# Patient Record
Sex: FEMALE | Race: WHITE
[De-identification: names, ages, dates, MRNs, and addresses within clinical notes are randomized per-mention and may not be internally consistent; named-entity substitution may affect disease eponyms.]

---

## 2021-12-08 ENCOUNTER — HOSPITAL ENCOUNTER (INPATIENT)
Dept: HOSPITAL 19 - COL.ER | Age: 66
LOS: 5 days | Discharge: HOME | DRG: 871 | End: 2021-12-13
Attending: INTERNAL MEDICINE | Admitting: INTERNAL MEDICINE
Payer: MEDICARE

## 2021-12-08 VITALS — HEIGHT: 65 IN | BODY MASS INDEX: 38.31 KG/M2 | WEIGHT: 229.94 LBS

## 2021-12-08 VITALS — DIASTOLIC BLOOD PRESSURE: 76 MMHG | SYSTOLIC BLOOD PRESSURE: 142 MMHG

## 2021-12-08 VITALS — DIASTOLIC BLOOD PRESSURE: 55 MMHG | SYSTOLIC BLOOD PRESSURE: 147 MMHG

## 2021-12-08 VITALS — SYSTOLIC BLOOD PRESSURE: 147 MMHG | TEMPERATURE: 98 F | HEART RATE: 92 BPM | DIASTOLIC BLOOD PRESSURE: 55 MMHG

## 2021-12-08 VITALS — SYSTOLIC BLOOD PRESSURE: 130 MMHG | DIASTOLIC BLOOD PRESSURE: 79 MMHG

## 2021-12-08 VITALS — TEMPERATURE: 100.3 F

## 2021-12-08 VITALS — DIASTOLIC BLOOD PRESSURE: 82 MMHG | SYSTOLIC BLOOD PRESSURE: 139 MMHG

## 2021-12-08 DIAGNOSIS — G93.41: ICD-10-CM

## 2021-12-08 DIAGNOSIS — R65.21: ICD-10-CM

## 2021-12-08 DIAGNOSIS — E83.42: ICD-10-CM

## 2021-12-08 DIAGNOSIS — E11.9: ICD-10-CM

## 2021-12-08 DIAGNOSIS — Z88.8: ICD-10-CM

## 2021-12-08 DIAGNOSIS — Z20.822: ICD-10-CM

## 2021-12-08 DIAGNOSIS — I10: ICD-10-CM

## 2021-12-08 DIAGNOSIS — I07.1: ICD-10-CM

## 2021-12-08 DIAGNOSIS — A40.9: Primary | ICD-10-CM

## 2021-12-08 DIAGNOSIS — Z79.84: ICD-10-CM

## 2021-12-08 DIAGNOSIS — E78.5: ICD-10-CM

## 2021-12-08 DIAGNOSIS — K81.1: ICD-10-CM

## 2021-12-08 DIAGNOSIS — E87.6: ICD-10-CM

## 2021-12-08 LAB
ALBUMIN SERPL-MCNC: 3.9 GM/DL (ref 3.4–4.8)
ALP SERPL-CCNC: 49 U/L (ref 40–150)
ALT SERPL-CCNC: 82 U/L (ref 0–55)
ANION GAP SERPL CALC-SCNC: 14 MMOL/L (ref 7–16)
APPEARANCE CSF: CLEAR
AST SERPL-CCNC: 50 U/L (ref 5–34)
BILIRUB SERPL-MCNC: 0.7 MG/DL (ref 0.2–1.2)
BUN SERPL-MCNC: 20 MG/DL (ref 10–20)
CALCIUM SERPL-MCNC: 9.1 MG/DL (ref 8.4–10.2)
CHLORIDE SERPL-SCNC: 94 MMOL/L (ref 98–107)
CO2 SERPL-SCNC: 25 MMOL/L (ref 23–31)
COLOR CSF: COLORLESS
CREAT SERPL-SCNC: 1.04 MG/DL (ref 0.57–1.11)
ERYTHROCYTE [DISTWIDTH] IN BLOOD BY AUTOMATED COUNT: 13.7 % (ref 11.5–14.5)
GLUCOSE SERPL-MCNC: 207 MG/DL (ref 70–99)
GRANULOCYTES NFR CSF MANUAL: 0 % (ref 0–6)
HCT VFR BLD AUTO: 39.7 % (ref 37–47)
HGB BLD-MCNC: 13.9 G/DL (ref 12.5–16)
LYMPHOCYTES NFR BLD MANUAL: 5 % (ref 20–51)
MCH RBC QN AUTO: 30 PG (ref 27–31)
MCHC RBC AUTO-ENTMCNC: 35 G/DL (ref 33–37)
MCV RBC AUTO: 85 FL (ref 80–100)
MONOCYTES NFR BLD: 5 % (ref 1.7–9.3)
MONOCYTES NFR CSF MANUAL: 100 % (ref 70–100)
NEUTS BAND NFR BLD: 26 % (ref 0–10)
NEUTS SEG NFR BLD MANUAL: 64 % (ref 42–75.2)
PH UR STRIP.AUTO: 5 [PH] (ref 5–8)
PLATELET # BLD AUTO: 136 K/MM3 (ref 130–400)
PLATELET BLD QL SMEAR: NORMAL
PMV BLD AUTO: 12.8 FL (ref 7.4–10.4)
POTASSIUM SERPL-SCNC: 3.3 MMOL/L (ref 3.5–4.5)
PROT CSF-MCNC: 95 MG/DL (ref 15–45)
PROT SERPL-MCNC: 6.7 GM/DL (ref 6.2–8.1)
RBC # BLD AUTO: 4.66 M/MM3 (ref 4.1–5.3)
RBC # CSF: < 1 /MM3 (ref 0–0)
RBC # UR: (no result) /HPF (ref 0–2)
SODIUM SERPL-SCNC: 133 MMOL/L (ref 136–145)
SP GR UR STRIP.AUTO: 1.01 (ref 1–1.03)
UA DIPSTICK PNL UR STRIP.AUTO: (no result)
URN COLLECT METHOD CLASS: (no result)

## 2021-12-08 PROCEDURE — 009U3ZX DRAINAGE OF SPINAL CANAL, PERCUTANEOUS APPROACH, DIAGNOSTIC: ICD-10-PCS | Performed by: RADIOLOGY

## 2021-12-08 PROCEDURE — 02HV33Z INSERTION OF INFUSION DEVICE INTO SUPERIOR VENA CAVA, PERCUTANEOUS APPROACH: ICD-10-PCS

## 2021-12-08 PROCEDURE — C1751 CATH, INF, PER/CENT/MIDLINE: HCPCS

## 2021-12-08 PROCEDURE — C9113 INJ PANTOPRAZOLE SODIUM, VIA: HCPCS

## 2021-12-08 NOTE — NUR
Patient arrived to ICU 6 at 2004. Patient is alert and orientated with steady
mobility. Patient  at bedside. All questions answered. Assessment
complete and charted. Denies needs at this time. Call light in reach

## 2021-12-09 VITALS — OXYGEN SATURATION: 94 %

## 2021-12-09 VITALS — HEART RATE: 98 BPM | SYSTOLIC BLOOD PRESSURE: 128 MMHG | DIASTOLIC BLOOD PRESSURE: 74 MMHG | TEMPERATURE: 100.2 F

## 2021-12-09 VITALS — SYSTOLIC BLOOD PRESSURE: 139 MMHG | DIASTOLIC BLOOD PRESSURE: 68 MMHG

## 2021-12-09 VITALS — TEMPERATURE: 99.2 F | SYSTOLIC BLOOD PRESSURE: 128 MMHG | DIASTOLIC BLOOD PRESSURE: 69 MMHG | HEART RATE: 84 BPM

## 2021-12-09 VITALS — HEART RATE: 106 BPM | SYSTOLIC BLOOD PRESSURE: 129 MMHG | DIASTOLIC BLOOD PRESSURE: 72 MMHG

## 2021-12-09 VITALS — DIASTOLIC BLOOD PRESSURE: 94 MMHG | HEART RATE: 87 BPM | SYSTOLIC BLOOD PRESSURE: 136 MMHG

## 2021-12-09 VITALS — TEMPERATURE: 99.1 F | SYSTOLIC BLOOD PRESSURE: 139 MMHG | DIASTOLIC BLOOD PRESSURE: 72 MMHG | HEART RATE: 106 BPM

## 2021-12-09 VITALS — OXYGEN SATURATION: 93 %

## 2021-12-09 VITALS — SYSTOLIC BLOOD PRESSURE: 129 MMHG | TEMPERATURE: 98.7 F | HEART RATE: 90 BPM | DIASTOLIC BLOOD PRESSURE: 68 MMHG

## 2021-12-09 VITALS — OXYGEN SATURATION: 91 %

## 2021-12-09 VITALS — TEMPERATURE: 98.7 F | DIASTOLIC BLOOD PRESSURE: 64 MMHG | SYSTOLIC BLOOD PRESSURE: 108 MMHG | HEART RATE: 82 BPM

## 2021-12-09 VITALS — TEMPERATURE: 99.8 F | HEART RATE: 98 BPM | SYSTOLIC BLOOD PRESSURE: 154 MMHG | DIASTOLIC BLOOD PRESSURE: 67 MMHG

## 2021-12-09 VITALS — OXYGEN SATURATION: 96 %

## 2021-12-09 VITALS — DIASTOLIC BLOOD PRESSURE: 41 MMHG | SYSTOLIC BLOOD PRESSURE: 86 MMHG

## 2021-12-09 VITALS — OXYGEN SATURATION: 92 %

## 2021-12-09 LAB
ALBUMIN SERPL-MCNC: 3.1 GM/DL (ref 3.4–4.8)
ALP SERPL-CCNC: 36 U/L (ref 40–150)
ALT SERPL-CCNC: 68 U/L (ref 0–55)
ANION GAP SERPL CALC-SCNC: 10 MMOL/L (ref 7–16)
ANISOCYTOSIS BLD QL: (no result)
AST SERPL-CCNC: 70 U/L (ref 5–34)
BILIRUB SERPL-MCNC: 0.8 MG/DL (ref 0.2–1.2)
BUN SERPL-MCNC: 15 MG/DL (ref 10–20)
CALCIUM SERPL-MCNC: 7.9 MG/DL (ref 8.4–10.2)
CHLORIDE SERPL-SCNC: 100 MMOL/L (ref 98–107)
CO2 SERPL-SCNC: 22 MMOL/L (ref 23–31)
CREAT SERPL-SCNC: 0.77 MG/DL (ref 0.57–1.11)
ERYTHROCYTE [DISTWIDTH] IN BLOOD BY AUTOMATED COUNT: 14.4 % (ref 11.5–14.5)
GLUCOSE SERPL-MCNC: 192 MG/DL (ref 70–99)
HCT VFR BLD AUTO: 34.4 % (ref 37–47)
HGB BLD-MCNC: 11.9 G/DL (ref 12.5–16)
LYMPHOCYTES NFR BLD MANUAL: 5 % (ref 20–51)
MAGNESIUM SERPL-MCNC: 1.4 MG/DL (ref 1.6–2.6)
MCH RBC QN AUTO: 30 PG (ref 27–31)
MCHC RBC AUTO-ENTMCNC: 35 G/DL (ref 33–37)
MCV RBC AUTO: 86 FL (ref 80–100)
MONOCYTES NFR BLD: 5 % (ref 1.7–9.3)
NEUTS BAND NFR BLD: 38 % (ref 0–10)
NEUTS SEG NFR BLD MANUAL: 52 % (ref 42–75.2)
PLATELET # BLD AUTO: 119 K/MM3 (ref 130–400)
PMV BLD AUTO: 12.6 FL (ref 7.4–10.4)
POTASSIUM SERPL-SCNC: 3.1 MMOL/L (ref 3.5–4.5)
PROT SERPL-MCNC: 6 GM/DL (ref 6.2–8.1)
RBC # BLD AUTO: 4.02 M/MM3 (ref 4.1–5.3)
SODIUM SERPL-SCNC: 132 MMOL/L (ref 136–145)

## 2021-12-09 NOTE — NUR
Patient remained on levophed throughout the night. Patient is alert and
orientated. Denies needs this AM. Call light in reach.

## 2021-12-09 NOTE — NUR
PATIENT IS A&O. VSS ON TELE. DENIES PAIN AT THIS TIME. HEAD TO TOE ASSESSMENT
COMPLETE. ORIENTED TO ROOM. CALL LIGHT IN REACH.

## 2021-12-09 NOTE — NUR
Patient assessed around 2135. Reported level 5 pain to back. Temp 100.2. Given
PRN APAP. PICC to RUE with fluids and IV ABXs running per orders. Voices no
questions, needs, or concerns at this time. In bed with call light within
reach.

## 2021-12-09 NOTE — NUR
met with patient to discuss discharge planning. Patient lives in
[place] with her , Colten (ph#[phone]) and sees Dr. Bailey for
primary care. Patient obtains medications from [facility] with no
difficulties and does not use any DME. Patient is independent with ADLS and
plans to return home upon discharge. Patient believes she had DPOA-HC, but is
not sure who she had designated. There is no copy in EMR and patient's legal
next of kin would be her , Colten.
 
Discharge Plan: Home

## 2021-12-10 VITALS — HEART RATE: 96 BPM | TEMPERATURE: 98.7 F | SYSTOLIC BLOOD PRESSURE: 152 MMHG | DIASTOLIC BLOOD PRESSURE: 60 MMHG

## 2021-12-10 VITALS — TEMPERATURE: 98.3 F | HEART RATE: 94 BPM | DIASTOLIC BLOOD PRESSURE: 72 MMHG | SYSTOLIC BLOOD PRESSURE: 160 MMHG

## 2021-12-10 VITALS — HEART RATE: 90 BPM | SYSTOLIC BLOOD PRESSURE: 163 MMHG | TEMPERATURE: 98.2 F | DIASTOLIC BLOOD PRESSURE: 78 MMHG

## 2021-12-10 VITALS — TEMPERATURE: 98.5 F | SYSTOLIC BLOOD PRESSURE: 179 MMHG | HEART RATE: 92 BPM | DIASTOLIC BLOOD PRESSURE: 67 MMHG

## 2021-12-10 VITALS — SYSTOLIC BLOOD PRESSURE: 134 MMHG | HEART RATE: 88 BPM | DIASTOLIC BLOOD PRESSURE: 53 MMHG | TEMPERATURE: 97.7 F

## 2021-12-10 VITALS — DIASTOLIC BLOOD PRESSURE: 76 MMHG | TEMPERATURE: 98.3 F | HEART RATE: 95 BPM | SYSTOLIC BLOOD PRESSURE: 162 MMHG

## 2021-12-10 VITALS — DIASTOLIC BLOOD PRESSURE: 70 MMHG | SYSTOLIC BLOOD PRESSURE: 164 MMHG | TEMPERATURE: 98.4 F | HEART RATE: 89 BPM

## 2021-12-10 LAB
ANION GAP SERPL CALC-SCNC: 9 MMOL/L (ref 7–16)
BASOPHILS # BLD: 0.1 K/MM3 (ref 0–0.2)
BASOPHILS NFR BLD AUTO: 0.6 % (ref 0–2)
BUN SERPL-MCNC: 9 MG/DL (ref 10–20)
CALCIUM SERPL-MCNC: 8.2 MG/DL (ref 8.4–10.2)
CHLORIDE SERPL-SCNC: 104 MMOL/L (ref 98–107)
CO2 SERPL-SCNC: 20 MMOL/L (ref 23–31)
CREAT SERPL-SCNC: 0.67 MG/DL (ref 0.57–1.11)
EOSINOPHIL # BLD: 0.1 K/MM3 (ref 0–0.7)
EOSINOPHIL NFR BLD: 0.7 % (ref 0–4)
ERYTHROCYTE [DISTWIDTH] IN BLOOD BY AUTOMATED COUNT: 15.1 % (ref 11.5–14.5)
GLUCOSE SERPL-MCNC: 136 MG/DL (ref 70–99)
GRANULOCYTES # BLD AUTO: 86.4 % (ref 42.2–75.2)
HCT VFR BLD AUTO: 35.3 % (ref 37–47)
HGB BLD-MCNC: 11.6 G/DL (ref 12.5–16)
LYMPHOCYTES # BLD: 1 K/MM3 (ref 1.2–3.4)
LYMPHOCYTES NFR BLD: 7.5 % (ref 20–51)
MAGNESIUM SERPL-MCNC: 2 MG/DL (ref 1.6–2.6)
MCH RBC QN AUTO: 30 PG (ref 27–31)
MCHC RBC AUTO-ENTMCNC: 33 G/DL (ref 33–37)
MCV RBC AUTO: 91 FL (ref 80–100)
MONOCYTES # BLD: 0.5 K/MM3 (ref 0.1–0.6)
MONOCYTES NFR BLD AUTO: 4 % (ref 1.7–9.3)
NEUTROPHILS # BLD: 11.6 K/MM3 (ref 1.4–6.5)
PLATELET # BLD AUTO: 103 K/MM3 (ref 130–400)
PMV BLD AUTO: 13.8 FL (ref 7.4–10.4)
POTASSIUM SERPL-SCNC: 3.6 MMOL/L (ref 3.5–4.5)
POTASSIUM SERPL-SCNC: 3.7 MMOL/L (ref 3.5–4.5)
RBC # BLD AUTO: 3.9 M/MM3 (ref 4.1–5.3)
SODIUM SERPL-SCNC: 133 MMOL/L (ref 136–145)

## 2021-12-10 NOTE — NUR
Patient assessed around 2000. Alert and oriented, and able to make needs
known. Denies having pain and discomfort. Indwelling stevens catheter D/C'd per
orders. Telemetry D/C'd per orders. Patient voices no questions, needs, or
concerns at this time. In bed with call light within reach.

## 2021-12-10 NOTE — NUR
Patient has had no further fevers this shift. Voices no questions, needs, or
concerns at this time. IV ABXs continue per orders. Resting in bed with call
light within reach.

## 2021-12-11 VITALS — SYSTOLIC BLOOD PRESSURE: 130 MMHG | TEMPERATURE: 97.6 F | HEART RATE: 99 BPM | DIASTOLIC BLOOD PRESSURE: 84 MMHG

## 2021-12-11 VITALS — TEMPERATURE: 97.6 F | DIASTOLIC BLOOD PRESSURE: 79 MMHG | SYSTOLIC BLOOD PRESSURE: 154 MMHG | HEART RATE: 95 BPM

## 2021-12-11 VITALS — DIASTOLIC BLOOD PRESSURE: 58 MMHG | SYSTOLIC BLOOD PRESSURE: 147 MMHG | HEART RATE: 87 BPM | TEMPERATURE: 98.4 F

## 2021-12-11 VITALS — SYSTOLIC BLOOD PRESSURE: 182 MMHG | DIASTOLIC BLOOD PRESSURE: 75 MMHG | TEMPERATURE: 98 F | HEART RATE: 95 BPM

## 2021-12-11 VITALS — DIASTOLIC BLOOD PRESSURE: 80 MMHG | TEMPERATURE: 97.6 F | HEART RATE: 92 BPM | SYSTOLIC BLOOD PRESSURE: 176 MMHG

## 2021-12-11 LAB
ANION GAP SERPL CALC-SCNC: 9 MMOL/L (ref 7–16)
BASOPHILS # BLD: 0.1 K/MM3 (ref 0–0.2)
BASOPHILS NFR BLD AUTO: 0.7 % (ref 0–2)
BUN SERPL-MCNC: 9 MG/DL (ref 10–20)
CALCIUM SERPL-MCNC: 9.3 MG/DL (ref 8.4–10.2)
CHLORIDE SERPL-SCNC: 103 MMOL/L (ref 98–107)
CO2 SERPL-SCNC: 22 MMOL/L (ref 23–31)
CREAT SERPL-SCNC: 0.67 MG/DL (ref 0.57–1.11)
EOSINOPHIL # BLD: 0.1 K/MM3 (ref 0–0.7)
EOSINOPHIL NFR BLD: 1.1 % (ref 0–4)
ERYTHROCYTE [DISTWIDTH] IN BLOOD BY AUTOMATED COUNT: 14.4 % (ref 11.5–14.5)
GLUCOSE SERPL-MCNC: 126 MG/DL (ref 70–99)
GRANULOCYTES # BLD AUTO: 78.3 % (ref 42.2–75.2)
HCT VFR BLD AUTO: 34.4 % (ref 37–47)
HGB BLD-MCNC: 11.5 G/DL (ref 12.5–16)
LYMPHOCYTES # BLD: 1.1 K/MM3 (ref 1.2–3.4)
LYMPHOCYTES NFR BLD: 13.4 % (ref 20–51)
MCH RBC QN AUTO: 29 PG (ref 27–31)
MCHC RBC AUTO-ENTMCNC: 33 G/DL (ref 33–37)
MCV RBC AUTO: 88 FL (ref 80–100)
MONOCYTES # BLD: 0.5 K/MM3 (ref 0.1–0.6)
MONOCYTES NFR BLD AUTO: 5.4 % (ref 1.7–9.3)
NEUTROPHILS # BLD: 6.5 K/MM3 (ref 1.4–6.5)
PLATELET # BLD AUTO: 109 K/MM3 (ref 130–400)
PMV BLD AUTO: 13.5 FL (ref 7.4–10.4)
POTASSIUM SERPL-SCNC: 3.9 MMOL/L (ref 3.5–4.5)
RBC # BLD AUTO: 3.91 M/MM3 (ref 4.1–5.3)
SODIUM SERPL-SCNC: 134 MMOL/L (ref 136–145)

## 2021-12-11 NOTE — NUR
PT ASSESSED. NO COMPLAINTS OF PAIN OR DYSPNEA. NO SIGNS OR SYMPTOMS OF
DISTRESS. CALL LIGHT WITHIN REACH

## 2021-12-11 NOTE — NUR
Patient assessed around 2100. Alert and oriented x 4, and able to make needs
known. Denies having pain and discomfort at this time. PICC to RUE. 3+ edema
RLE, 2+ LLE. Voices no questions, needs, or concerns at this time. In bed with
call light within reach.

## 2021-12-11 NOTE — NUR
Patient has been resting in bed with call light within reach. Has denied pain
and discomfort. Continues on IV ABXs per orders. Voices no questions, needs,
or concerns at this time. In bed with call light within reach.

## 2021-12-12 VITALS — HEART RATE: 82 BPM | SYSTOLIC BLOOD PRESSURE: 140 MMHG | TEMPERATURE: 98.4 F | DIASTOLIC BLOOD PRESSURE: 50 MMHG

## 2021-12-12 VITALS — SYSTOLIC BLOOD PRESSURE: 162 MMHG | DIASTOLIC BLOOD PRESSURE: 87 MMHG | TEMPERATURE: 99.2 F | HEART RATE: 86 BPM

## 2021-12-12 VITALS — TEMPERATURE: 98.3 F | DIASTOLIC BLOOD PRESSURE: 80 MMHG | HEART RATE: 74 BPM | SYSTOLIC BLOOD PRESSURE: 117 MMHG

## 2021-12-12 VITALS — SYSTOLIC BLOOD PRESSURE: 154 MMHG | DIASTOLIC BLOOD PRESSURE: 78 MMHG | TEMPERATURE: 98.2 F | HEART RATE: 78 BPM

## 2021-12-12 VITALS — DIASTOLIC BLOOD PRESSURE: 75 MMHG | TEMPERATURE: 98.4 F | HEART RATE: 89 BPM | SYSTOLIC BLOOD PRESSURE: 178 MMHG

## 2021-12-12 VITALS — DIASTOLIC BLOOD PRESSURE: 48 MMHG | TEMPERATURE: 97.6 F | SYSTOLIC BLOOD PRESSURE: 135 MMHG | HEART RATE: 77 BPM

## 2021-12-12 VITALS — SYSTOLIC BLOOD PRESSURE: 152 MMHG | DIASTOLIC BLOOD PRESSURE: 68 MMHG

## 2021-12-12 VITALS — TEMPERATURE: 97.5 F | HEART RATE: 90 BPM | DIASTOLIC BLOOD PRESSURE: 68 MMHG | SYSTOLIC BLOOD PRESSURE: 152 MMHG

## 2021-12-12 LAB
ALBUMIN SERPL-MCNC: 3.1 GM/DL (ref 3.4–4.8)
ALP SERPL-CCNC: 56 U/L (ref 40–150)
ALT SERPL-CCNC: 50 U/L (ref 0–55)
ANION GAP SERPL CALC-SCNC: 13 MMOL/L (ref 7–16)
AST SERPL-CCNC: 29 U/L (ref 5–34)
BILIRUB SERPL-MCNC: 1 MG/DL (ref 0.2–1.2)
BUN SERPL-MCNC: 11 MG/DL (ref 10–20)
CALCIUM SERPL-MCNC: 9.1 MG/DL (ref 8.4–10.2)
CHLORIDE SERPL-SCNC: 97 MMOL/L (ref 98–107)
CO2 SERPL-SCNC: 25 MMOL/L (ref 23–31)
CREAT SERPL-SCNC: 0.67 MG/DL (ref 0.57–1.11)
EOSINOPHIL NFR BLD: 2 % (ref 0–4)
ERYTHROCYTE [DISTWIDTH] IN BLOOD BY AUTOMATED COUNT: 14.1 % (ref 11.5–14.5)
GLUCOSE SERPL-MCNC: 121 MG/DL (ref 70–99)
HCT VFR BLD AUTO: 35.2 % (ref 37–47)
HGB BLD-MCNC: 11.7 G/DL (ref 12.5–16)
LYMPHOCYTES NFR BLD MANUAL: 24 % (ref 20–51)
MCH RBC QN AUTO: 29 PG (ref 27–31)
MCHC RBC AUTO-ENTMCNC: 33 G/DL (ref 33–37)
MCV RBC AUTO: 87 FL (ref 80–100)
MONOCYTES NFR BLD: 9 % (ref 1.7–9.3)
NEUTS BAND NFR BLD: 2 % (ref 0–10)
NEUTS SEG NFR BLD MANUAL: 63 % (ref 42–75.2)
PLATELET # BLD AUTO: 121 K/MM3 (ref 130–400)
PLATELET BLD QL SMEAR: (no result)
PMV BLD AUTO: 13.5 FL (ref 7.4–10.4)
POTASSIUM SERPL-SCNC: 3.3 MMOL/L (ref 3.5–4.5)
PROT SERPL-MCNC: 6.5 GM/DL (ref 6.2–8.1)
RBC # BLD AUTO: 4.04 M/MM3 (ref 4.1–5.3)
SODIUM SERPL-SCNC: 135 MMOL/L (ref 136–145)

## 2021-12-12 NOTE — NUR
Patient has denied having pain and discomfort this shift. Received scheduled
ABX per orders. Voices no questions, needs, or concerns at this time. In bed
with call light within reach.

## 2021-12-12 NOTE — NUR
Report received, assumed care for night shift. Assessment complete. A&Ox4.
Denies pain/nausea/shortness of breath. VS remain stable. PICC to right upper
arm flushes without difficulty. Zosyn/potassium currently infusing. NC WNL.
Noted to have BLE extremity edema-RLE-3+/LLE-4+. LLE with warmth/redness. Plan
of care discussed for this shift to include meds/calling for
questions/concerns/ultrasound in AM. Verbalizes understanding. Call light in
reach. Will monitor.

## 2021-12-13 VITALS — SYSTOLIC BLOOD PRESSURE: 142 MMHG | DIASTOLIC BLOOD PRESSURE: 80 MMHG | TEMPERATURE: 98.1 F | HEART RATE: 88 BPM

## 2021-12-13 VITALS — SYSTOLIC BLOOD PRESSURE: 167 MMHG | HEART RATE: 83 BPM | TEMPERATURE: 97.8 F | DIASTOLIC BLOOD PRESSURE: 63 MMHG

## 2021-12-13 VITALS — SYSTOLIC BLOOD PRESSURE: 144 MMHG | DIASTOLIC BLOOD PRESSURE: 70 MMHG

## 2021-12-13 VITALS — HEART RATE: 80 BPM | SYSTOLIC BLOOD PRESSURE: 177 MMHG | DIASTOLIC BLOOD PRESSURE: 71 MMHG | TEMPERATURE: 98.4 F

## 2021-12-13 LAB — PATH REV BLD -IMP: (no result)

## 2021-12-13 NOTE — NUR
Blood sugar was reported as 132, but is not uploading to [facility] from the
glucometer.

## 2021-12-13 NOTE — NUR
Presented patient with MCR IM form. Education provided to the patient and
signature witnessed. Copy placed in the patient's chart and original provided
back to the patient.

## 2021-12-13 NOTE — NUR
Patient doing well this morning. Plan is for patient to discharge home. Anneliese
notified that PICC needs to be removed; This will be done after the patient's
zosyn is done running.

## 2021-12-13 NOTE — NUR
Rested off and on this shift. Remained afebrile. Denied pain/nausea/shortness
of breath. VS stable. Plan for venous duplex this am. Denies
questions/concerns. Call light in reach. Will monitor.

## 2021-12-13 NOTE — NUR
Patient discharged home and escorted out by [facility]. PICC was removed by
Anneliese Moon RN. Patient edcuation discussed and understanding was verbalized.

## 2021-12-16 ENCOUNTER — HOSPITAL ENCOUNTER (OUTPATIENT)
Dept: HOSPITAL 19 - COL.LAB | Age: 66
End: 2021-12-16
Attending: PHYSICIAN ASSISTANT
Payer: MEDICARE

## 2021-12-16 DIAGNOSIS — E87.6: Primary | ICD-10-CM

## 2021-12-16 LAB
ANION GAP SERPL CALC-SCNC: 11 MMOL/L (ref 7–16)
BUN SERPL-MCNC: 12 MG/DL (ref 10–20)
CALCIUM SERPL-MCNC: 9.9 MG/DL (ref 8.4–10.2)
CHLORIDE SERPL-SCNC: 96 MMOL/L (ref 98–107)
CO2 SERPL-SCNC: 28 MMOL/L (ref 23–31)
CREAT SERPL-SCNC: 0.76 MG/DL (ref 0.57–1.11)
GLUCOSE SERPL-MCNC: 140 MG/DL (ref 70–99)
MAGNESIUM SERPL-MCNC: 1.8 MG/DL (ref 1.6–2.6)
POTASSIUM SERPL-SCNC: 3.9 MMOL/L (ref 3.5–4.5)
SODIUM SERPL-SCNC: 135 MMOL/L (ref 136–145)